# Patient Record
Sex: FEMALE | Race: WHITE | URBAN - METROPOLITAN AREA
[De-identification: names, ages, dates, MRNs, and addresses within clinical notes are randomized per-mention and may not be internally consistent; named-entity substitution may affect disease eponyms.]

---

## 2021-01-07 ENCOUNTER — OFFICE VISIT (OUTPATIENT)
Dept: SURGERY | Facility: CLINIC | Age: 56
End: 2021-01-07
Payer: COMMERCIAL

## 2021-01-07 VITALS
HEART RATE: 68 BPM | SYSTOLIC BLOOD PRESSURE: 155 MMHG | WEIGHT: 175 LBS | DIASTOLIC BLOOD PRESSURE: 102 MMHG | RESPIRATION RATE: 20 BRPM | OXYGEN SATURATION: 98 %

## 2021-01-07 DIAGNOSIS — R79.89 ABNORMAL LIVER FUNCTION TEST: Primary | ICD-10-CM

## 2021-01-07 RX ORDER — ACETAMINOPHEN 160 MG/5ML
SOLUTION ORAL
COMMUNITY
End: 2021-06-03 | Stop reason: ALTCHOICE

## 2021-01-07 NOTE — PROGRESS NOTES
[facility] at [facility]  [address], [address]  [address]  [phone] ([phone]) maintenance regimen, likely AZA +/- ursodiol depending on biopsy and response to immunosuppression  - Discussed that AZA may have dual role of helping with her inflammatory arthritis as well but this is uncertain  - Agree with holding methotrexate given th

## 2021-02-04 ENCOUNTER — OFFICE VISIT (OUTPATIENT)
Dept: SURGERY | Facility: CLINIC | Age: 56
End: 2021-02-04
Payer: COMMERCIAL

## 2021-02-04 VITALS
RESPIRATION RATE: 16 BRPM | OXYGEN SATURATION: 98 % | BODY MASS INDEX: 27.15 KG/M2 | DIASTOLIC BLOOD PRESSURE: 85 MMHG | WEIGHT: 173 LBS | HEART RATE: 68 BPM | SYSTOLIC BLOOD PRESSURE: 129 MMHG | HEIGHT: 67 IN

## 2021-02-04 DIAGNOSIS — K75.4 AUTOIMMUNE HEPATITIS (HCC): Primary | ICD-10-CM

## 2021-02-04 RX ORDER — PREDNISONE 20 MG/1
40 TABLET ORAL DAILY
Qty: 60 TABLET | Refills: 1 | Status: SHIPPED | OUTPATIENT
Start: 2021-02-04 | End: 2021-06-03 | Stop reason: ALTCHOICE

## 2021-02-04 NOTE — PROGRESS NOTES
[facility] at [facility]  [address], [address]  [address], [address]  [phone] ([phone]) (Elevated LIS, ASMA, and AMA)   Stains for fibrosis (trichrome and reticulin stains): Periportal fibrosis   with bile ductular reaction. Mild steatosis. No evidence of cirrhosis or granuloma or malignancy.    Stain for iron (Prussian blue stain): No sta introducing ursodiol depending on how LFT respond  - Discussed AZA may help joints but not certain but will be of interest to see if helps.  If not, then will need additional agent for arthritis  - Agree with holding methotrexate given the elevated LFT and

## 2021-02-07 PROBLEM — K75.4 AUTOIMMUNE HEPATITIS (HCC): Status: ACTIVE | Noted: 2021-02-07

## 2021-02-24 ENCOUNTER — TELEPHONE (OUTPATIENT)
Dept: SURGERY | Facility: CLINIC | Age: 56
End: 2021-02-24

## 2021-02-25 ENCOUNTER — TELEPHONE (OUTPATIENT)
Dept: SURGERY | Facility: CLINIC | Age: 56
End: 2021-02-25

## 2021-02-25 DIAGNOSIS — K75.4 AUTOIMMUNE HEPATITIS (HCC): Primary | ICD-10-CM

## 2021-02-25 RX ORDER — AZATHIOPRINE 50 MG/1
50 TABLET ORAL DAILY
Qty: 30 TABLET | Refills: 5 | Status: SHIPPED | OUTPATIENT
Start: 2021-02-25 | End: 2021-03-22

## 2021-02-25 NOTE — TELEPHONE ENCOUNTER
Spoke to patient. Informed that labs are improving. Instructed to decrease prednisone to 20 mg x 5 days, then 10 mg daily and to begin azathioprine 50 mg daily (prescription filled). Continue with labs Q2 weeks.     DAVIDSON Perkins  Nurse Practitioner

## 2021-02-25 NOTE — TELEPHONE ENCOUNTER
2/4/21 labs - ALT 79, AST 53, Alk phos 227, IgG 2028  - Prednisone 40 mg started    2/18/21 - ALT 43, AST 24, Alk phos 103, IgG 1288, IgM 409  - Will decrease prednisone to 20 mg x 5 days, then 10 mg daily  - Introduce azathioprine 50 mg daily and titr

## 2021-03-15 ENCOUNTER — TELEPHONE (OUTPATIENT)
Dept: SURGERY | Facility: CLINIC | Age: 56
End: 2021-03-15

## 2021-03-15 NOTE — TELEPHONE ENCOUNTER
Returned call concerning potential reaction to azathioprine (puffy,swollen face and cheeks). Instructed patient to stop taking medication and to call back later in week to let us know status. Will consider medication change - potentially Cellcept.      And

## 2021-03-22 ENCOUNTER — TELEPHONE (OUTPATIENT)
Dept: SURGERY | Facility: CLINIC | Age: 56
End: 2021-03-22

## 2021-03-22 DIAGNOSIS — K75.4 AUTOIMMUNE HEPATITIS (HCC): Primary | ICD-10-CM

## 2021-03-22 RX ORDER — PREDNISONE 1 MG/1
5 TABLET ORAL DAILY
Qty: 14 TABLET | Refills: 1 | Status: SHIPPED | OUTPATIENT
Start: 2021-03-22 | End: 2021-06-03 | Stop reason: ALTCHOICE

## 2021-03-22 RX ORDER — MYCOPHENOLATE MOFETIL 500 MG/1
500 TABLET ORAL 2 TIMES DAILY
Qty: 60 TABLET | Refills: 11 | Status: SHIPPED | OUTPATIENT
Start: 2021-03-22 | End: 2021-06-03

## 2021-03-22 NOTE — TELEPHONE ENCOUNTER
Instructed patient to begin on CellCept 500 mg BID and to further taper prednisone to 5 mg for one week then 5 mg EOD for another week which is about the time of her upcoming appointment on April 1.     DAVIDSON Bernardo  Nurse Practitioner, Hepatology  27

## 2021-03-31 NOTE — PROGRESS NOTES
[facility] at [facility]  [address], [address]  [address]  [phone] ([phone]) normal affect/mood    Data:  11/2/20 - SMA + 38, AMA > 1:640,   HBcAb+, HBsAg(-), HCV (-)    2/4/21 labs - ALT 79, AST 53, Alk phos 227, IgG 2028  - Prednisone 40 mg started     2/18/21 - ALT 43, AST 24, Alk phos 103, IgG 1288, IgM 409    3/4/21 - ALT 39, phosphatase 157. Serology show ANCA screen negative, CCP antibody   <8.0, antinuclear antibody negative, LIS   screen IFA with reflex is positive LIS pattern 1 >OR=1:1280; LIS Titer 1   Cytoplasmic, Reticular/AMA. Rheumatoid factor   18.       Assessment an

## 2021-04-01 ENCOUNTER — OFFICE VISIT (OUTPATIENT)
Dept: SURGERY | Facility: CLINIC | Age: 56
End: 2021-04-01
Payer: COMMERCIAL

## 2021-04-01 VITALS
BODY MASS INDEX: 27.15 KG/M2 | HEIGHT: 67 IN | WEIGHT: 173 LBS | SYSTOLIC BLOOD PRESSURE: 125 MMHG | HEART RATE: 80 BPM | RESPIRATION RATE: 16 BRPM | DIASTOLIC BLOOD PRESSURE: 86 MMHG | OXYGEN SATURATION: 99 %

## 2021-04-01 DIAGNOSIS — K75.4 AUTOIMMUNE HEPATITIS (HCC): Primary | ICD-10-CM

## 2021-04-01 NOTE — PROGRESS NOTES
[facility] at [facility]  [address], [address]  [address]  [phone] ([phone]) Anicteric  Lymph: no cervical LAD  Chest: no angiomata  CV: RRR, no murmur, no edema  Lungs: Clear to auscultation (B)  Abd: non-distended, non-tender, no hepatosplenomegaly  Derm: no rash  Neuro: A&Ox3, no asterixis  Psych: normal affect/mood    Data:  11 (> 1/640). Methotrexate was started on November   for possible rheumatoid arthritis. Liver   biopsy is done to sort out if this is PBC or AIH -PBC overlap or just AIH. Liver function tests shows AST 31, ALT 53 and   alkaline phosphatase 157.  Serology brian

## 2021-04-19 ENCOUNTER — DOCUMENTATION ONLY (OUTPATIENT)
Dept: SURGERY | Facility: CLINIC | Age: 56
End: 2021-04-19

## 2021-04-19 NOTE — PROGRESS NOTES
Labs 4/5/21: Completing prednisone taper 5 mg every other day  Labs 4/15/21: off prednisone; more elevated than prior      11/2/20 - SMA + 38, AMA > 1:640,   HBcAb+, HBsAg(-), HCV (-)     2/4/21 labs - ALT 79, AST 53, Alk phos 227, IgG 2028  - Prednisone 4

## 2021-06-03 ENCOUNTER — OFFICE VISIT (OUTPATIENT)
Dept: SURGERY | Facility: CLINIC | Age: 56
End: 2021-06-03
Payer: COMMERCIAL

## 2021-06-03 VITALS
HEART RATE: 83 BPM | RESPIRATION RATE: 16 BRPM | WEIGHT: 174 LBS | HEIGHT: 67 IN | DIASTOLIC BLOOD PRESSURE: 88 MMHG | SYSTOLIC BLOOD PRESSURE: 122 MMHG | BODY MASS INDEX: 27.31 KG/M2 | OXYGEN SATURATION: 96 %

## 2021-06-03 DIAGNOSIS — K75.4 AUTOIMMUNE HEPATITIS (HCC): Primary | ICD-10-CM

## 2021-06-03 RX ORDER — MYCOPHENOLATE MOFETIL 500 MG/1
500 TABLET ORAL 3 TIMES DAILY
Qty: 90 TABLET | Refills: 11 | Status: SHIPPED | OUTPATIENT
Start: 2021-06-03

## 2021-06-03 NOTE — PROGRESS NOTES
[facility] at [facility]  [address], [address]  [address]  [phone] ([phone]) Cuff Size: adult)   Pulse 83   Resp 16   Ht 1.702 m (5' 7\")   Wt 78.9 kg (174 lb)   SpO2 96%   BMI 27.25 kg/m²   Gen: NAD  HEENT: Anicteric  Lymph: no cervical LAD  Chest: no angiomata  CV: RRR, no murmur, no edema  Lungs: Clear to auscultation (B)  Abd: plasma cells,   rosetting, and intrasinusoidal lymphocytes, with periportal fibrosis. There is no evidence of cirrhosis or granuloma or malignancy.  The   immunohistochemical stains (MUM 1, CD68, and CK 7)   performed on the sections of biopsy tissues brian [address]JU [address] (office)  [phone] (fax)  [phone] (mobile)  [email or web address]

## 2021-06-03 NOTE — PROGRESS NOTES
Discuss with Samantha:  Think we can go up on Cellcept - WBC fine      [facility] at [facility]  [address]    Exam:  There were no vitals taken for this visit.   Gen: NAD  HEENT: Anicteric  Lymph: no cervical LAD  Chest: no angiomata  CV: RRR, no murmur, no edema  Lungs: Clear to auscultation (B)  Abd: non-distended, non-tender, no hepatosplenomegaly  Derm: periportal fibrosis. There is no evidence of cirrhosis or granuloma or malignancy. The   immunohistochemical stains (MUM 1, CD68, and CK 7)   performed on the sections of biopsy tissues show CK 7 positive proliferating   ductal tissues.  There are many [facility] [address], [address] [facility], [place], [place], [address] (office)  [phone] (fax)  [place]@"[employer]"

## 2021-09-15 ENCOUNTER — OFFICE VISIT (OUTPATIENT)
Dept: SURGERY | Facility: CLINIC | Age: 56
End: 2021-09-15
Payer: COMMERCIAL

## 2021-09-15 VITALS
DIASTOLIC BLOOD PRESSURE: 92 MMHG | WEIGHT: 160 LBS | BODY MASS INDEX: 25 KG/M2 | HEART RATE: 72 BPM | SYSTOLIC BLOOD PRESSURE: 138 MMHG | TEMPERATURE: 99 F | OXYGEN SATURATION: 97 % | RESPIRATION RATE: 18 BRPM

## 2021-09-15 DIAGNOSIS — K74.3 PRIMARY BILIARY CHOLANGITIS (HCC): Primary | ICD-10-CM

## 2021-09-15 RX ORDER — URSODIOL 500 MG/1
500 TABLET, FILM COATED ORAL 2 TIMES DAILY
Qty: 180 TABLET | Refills: 3 | Status: SHIPPED | OUTPATIENT
Start: 2021-09-15

## 2021-09-15 NOTE — PROGRESS NOTES
[facility] at [facility]  [address], [address]  [address]  [phone] ([phone]) Temp 98.7 °F (37.1 °C) (Temporal)   Resp 18   Wt 72.6 kg (160 lb)   SpO2 97%   BMI 25.06 kg/m²   Gen: NAD  HEENT: Anicteric  Lymph: no cervical LAD  Chest: no angiomata  CV: RRR, no murmur, no edema  Lungs: Clear to auscultation (B)  Abd: non-distended, n interface activity with plasma cells,   rosetting, and intrasinusoidal lymphocytes, with periportal fibrosis. There is no evidence of cirrhosis or granuloma or malignancy.  The   immunohistochemical stains (MUM 1, CD68, and CK 7)   performed on the sectio (fax)  [phone] (mobile)  [email or web address]

## 2021-12-15 ENCOUNTER — OFFICE VISIT (OUTPATIENT)
Dept: SURGERY | Facility: CLINIC | Age: 56
End: 2021-12-15
Payer: COMMERCIAL

## 2021-12-15 VITALS
HEART RATE: 91 BPM | WEIGHT: 159.19 LBS | OXYGEN SATURATION: 96 % | SYSTOLIC BLOOD PRESSURE: 118 MMHG | RESPIRATION RATE: 14 BRPM | BODY MASS INDEX: 25 KG/M2 | DIASTOLIC BLOOD PRESSURE: 83 MMHG

## 2021-12-15 DIAGNOSIS — K75.4 AUTOIMMUNE HEPATITIS (HCC): ICD-10-CM

## 2021-12-15 DIAGNOSIS — K74.3 PRIMARY BILIARY CHOLANGITIS (HCC): Primary | ICD-10-CM

## 2021-12-15 NOTE — PROGRESS NOTES
[facility] at [address], [address]  [address]  [phone] ([phone]) daily., Disp: 90 tablet, Rfl: 11      Social History:  Quit tobacco in 2020  Social ETOH  No drug use    Exam:  /83   Pulse 91   Resp 14   Wt 72.2 kg (159 lb 3.2 oz)   SpO2 96%   BMI 24.93 kg/m²   Vitals per chart  Gen: NAD  HEENT: Anicteric  Lungs: proliferation, bile duct injury with bile   duct lymphocytic cholangitis, interface activity with plasma cells,   rosetting, and intrasinusoidal lymphocytes, with periportal fibrosis. There is no evidence of cirrhosis or granuloma or malignancy.  The   im [address] [place] [address], 7th floor, [place], [place],  [facility] (office)  [phone] (fax)  [phone] (mobile)  [email or web address]

## 2022-06-15 ENCOUNTER — OFFICE VISIT (OUTPATIENT)
Dept: SURGERY | Facility: CLINIC | Age: 57
End: 2022-06-15
Payer: COMMERCIAL

## 2022-06-15 VITALS
OXYGEN SATURATION: 96 % | TEMPERATURE: 99 F | RESPIRATION RATE: 18 BRPM | DIASTOLIC BLOOD PRESSURE: 85 MMHG | WEIGHT: 165 LBS | SYSTOLIC BLOOD PRESSURE: 126 MMHG | HEART RATE: 68 BPM | BODY MASS INDEX: 26 KG/M2

## 2022-07-05 DIAGNOSIS — K74.3 PRIMARY BILIARY CHOLANGITIS (HCC): ICD-10-CM

## 2022-07-05 RX ORDER — URSODIOL 500 MG/1
500 TABLET, FILM COATED ORAL 2 TIMES DAILY
Qty: 180 TABLET | Refills: 3 | Status: SHIPPED | OUTPATIENT
Start: 2022-07-05